# Patient Record
Sex: MALE | Race: ASIAN | NOT HISPANIC OR LATINO | Employment: FULL TIME | URBAN - METROPOLITAN AREA
[De-identification: names, ages, dates, MRNs, and addresses within clinical notes are randomized per-mention and may not be internally consistent; named-entity substitution may affect disease eponyms.]

---

## 2019-11-08 ENCOUNTER — OFFICE VISIT (OUTPATIENT)
Dept: FAMILY MEDICINE | Facility: CLINIC | Age: 34
End: 2019-11-08
Payer: COMMERCIAL

## 2019-11-08 VITALS
RESPIRATION RATE: 16 BRPM | HEART RATE: 70 BPM | HEIGHT: 66 IN | TEMPERATURE: 97.8 F | BODY MASS INDEX: 22.98 KG/M2 | DIASTOLIC BLOOD PRESSURE: 78 MMHG | WEIGHT: 143 LBS | OXYGEN SATURATION: 99 % | SYSTOLIC BLOOD PRESSURE: 116 MMHG

## 2019-11-08 DIAGNOSIS — K64.8 INTERNAL HEMORRHOIDS: ICD-10-CM

## 2019-11-08 DIAGNOSIS — K62.5 PAINLESS RECTAL BLEEDING: ICD-10-CM

## 2019-11-08 DIAGNOSIS — Z00.00 ROUTINE GENERAL MEDICAL EXAMINATION AT A HEALTH CARE FACILITY: Primary | ICD-10-CM

## 2019-11-08 LAB
ALBUMIN SERPL-MCNC: 4.1 G/DL (ref 3.4–5)
ALP SERPL-CCNC: 52 U/L (ref 40–150)
ALT SERPL W P-5'-P-CCNC: 21 U/L (ref 0–70)
ANION GAP SERPL CALCULATED.3IONS-SCNC: 6 MMOL/L (ref 3–14)
AST SERPL W P-5'-P-CCNC: 21 U/L (ref 0–45)
BASOPHILS # BLD AUTO: 0 10E9/L (ref 0–0.2)
BASOPHILS NFR BLD AUTO: 0.3 %
BILIRUB SERPL-MCNC: 1.6 MG/DL (ref 0.2–1.3)
BUN SERPL-MCNC: 14 MG/DL (ref 7–30)
CALCIUM SERPL-MCNC: 8.7 MG/DL (ref 8.5–10.1)
CHLORIDE SERPL-SCNC: 105 MMOL/L (ref 94–109)
CHOLEST SERPL-MCNC: 143 MG/DL
CO2 SERPL-SCNC: 29 MMOL/L (ref 20–32)
CREAT SERPL-MCNC: 0.89 MG/DL (ref 0.66–1.25)
DIFFERENTIAL METHOD BLD: NORMAL
EOSINOPHIL # BLD AUTO: 0.1 10E9/L (ref 0–0.7)
EOSINOPHIL NFR BLD AUTO: 1.3 %
ERYTHROCYTE [DISTWIDTH] IN BLOOD BY AUTOMATED COUNT: 12.2 % (ref 10–15)
GFR SERPL CREATININE-BSD FRML MDRD: >90 ML/MIN/{1.73_M2}
GLUCOSE SERPL-MCNC: 85 MG/DL (ref 70–99)
HCT VFR BLD AUTO: 42 % (ref 40–53)
HDLC SERPL-MCNC: 40 MG/DL
HGB BLD-MCNC: 14.7 G/DL (ref 13.3–17.7)
LDLC SERPL CALC-MCNC: 86 MG/DL
LYMPHOCYTES # BLD AUTO: 1.8 10E9/L (ref 0.8–5.3)
LYMPHOCYTES NFR BLD AUTO: 28.9 %
MCH RBC QN AUTO: 30.4 PG (ref 26.5–33)
MCHC RBC AUTO-ENTMCNC: 35 G/DL (ref 31.5–36.5)
MCV RBC AUTO: 87 FL (ref 78–100)
MONOCYTES # BLD AUTO: 0.4 10E9/L (ref 0–1.3)
MONOCYTES NFR BLD AUTO: 5.9 %
NEUTROPHILS # BLD AUTO: 4 10E9/L (ref 1.6–8.3)
NEUTROPHILS NFR BLD AUTO: 63.6 %
NONHDLC SERPL-MCNC: 103 MG/DL
PLATELET # BLD AUTO: 200 10E9/L (ref 150–450)
POTASSIUM SERPL-SCNC: 3.5 MMOL/L (ref 3.4–5.3)
PROT SERPL-MCNC: 7.9 G/DL (ref 6.8–8.8)
RBC # BLD AUTO: 4.84 10E12/L (ref 4.4–5.9)
SODIUM SERPL-SCNC: 140 MMOL/L (ref 133–144)
TRIGL SERPL-MCNC: 86 MG/DL
WBC # BLD AUTO: 6.3 10E9/L (ref 4–11)

## 2019-11-08 PROCEDURE — 99213 OFFICE O/P EST LOW 20 MIN: CPT | Mod: 25 | Performed by: INTERNAL MEDICINE

## 2019-11-08 PROCEDURE — 85025 COMPLETE CBC W/AUTO DIFF WBC: CPT | Performed by: INTERNAL MEDICINE

## 2019-11-08 PROCEDURE — 80053 COMPREHEN METABOLIC PANEL: CPT | Performed by: INTERNAL MEDICINE

## 2019-11-08 PROCEDURE — 80061 LIPID PANEL: CPT | Performed by: INTERNAL MEDICINE

## 2019-11-08 PROCEDURE — 99385 PREV VISIT NEW AGE 18-39: CPT | Performed by: INTERNAL MEDICINE

## 2019-11-08 PROCEDURE — 36415 COLL VENOUS BLD VENIPUNCTURE: CPT | Performed by: INTERNAL MEDICINE

## 2019-11-08 RX ORDER — HYDROCORTISONE ACETATE 25 MG/1
25 SUPPOSITORY RECTAL 2 TIMES DAILY
Qty: 24 SUPPOSITORY | Refills: 2 | Status: SHIPPED | OUTPATIENT
Start: 2019-11-08

## 2019-11-08 ASSESSMENT — PATIENT HEALTH QUESTIONNAIRE - PHQ9: SUM OF ALL RESPONSES TO PHQ QUESTIONS 1-9: 13

## 2019-11-08 ASSESSMENT — PAIN SCALES - GENERAL: PAINLEVEL: NO PAIN (0)

## 2019-11-08 ASSESSMENT — MIFFLIN-ST. JEOR: SCORE: 1541.77

## 2019-11-08 NOTE — LETTER
[address]  [address]  [phone]    November 19, 2019      Manisha Her  [address]  [facility] [address]          Dear Manisha,                     Your cholesterol (lipid) panel is normal. You do not have diabetes. Both kidney and liver function are normal. You are also not anemic.Let's repeat your tests in one year. For any questions, you may call my office at [phone].     Sincerely,     Dean Salas MD   Internal Medicine

## 2019-11-08 NOTE — PATIENT INSTRUCTIONS
Preventive Health Recommendations  Male Ages 26 - 39    Yearly exam:             See your health care provider every year in order to  o   Review health changes.   o   Discuss preventive care.    o   Review your medicines if your doctor has prescribed any.    You should be tested each year for STDs (sexually transmitted diseases), if you re at risk.     After age 35, talk to your provider about cholesterol testing. If you are at risk for heart disease, have your cholesterol tested at least every 5 years.     If you are at risk for diabetes, you should have a diabetes test (fasting glucose).  Shots: Get a flu shot each year. Get a tetanus shot every 10 years.     Nutrition:    Eat at least 5 servings of fruits and vegetables daily.     Eat whole-grain bread, whole-wheat pasta and brown rice instead of white grains and rice.     Get adequate Calcium and Vitamin D.     Lifestyle    Exercise for at least 150 minutes a week (30 minutes a day, 5 days a week). This will help you control your weight and prevent disease.     Limit alcohol to one drink per day.     No smoking.     Wear sunscreen to prevent skin cancer.     See your dentist every six months for an exam and cleaning.   At [facility], we strive to deliver an exceptional experience to you, every time we see you.  If you receive a survey in the mail, please send us back your thoughts. We really do value your feedback.    Based on your medical history, these are the current health maintenance/preventive care services that you are due for (some may have been done at this visit.)  Health Maintenance Due   Topic Date Due     PREVENTIVE CARE VISIT  1986     HIV SCREENING  08/17/2001     DTAP/TDAP/TD IMMUNIZATION (1 - Tdap) 08/17/2011     PHQ-2  01/01/2019     INFLUENZA VACCINE (1) 09/01/2019         Suggested websites for health information:  Www.[facility].[employer] : Up to date and easily searchable information on multiple  topics.  Www.medlineplus.gov : medication info, interactive tutorials, watch real surgeries online  Www.familydoctor.org : good info from the Academy of Family Physicians  Www.cdc.gov : public health info, travel advisories, epidemics (H1N1)  Www.aap.org : children's health info, normal development, vaccinations  Www.health.[facility].[place].us : [place] dept of health, public health issues in [place], N1N1    Your care team:                            Family Medicine Internal Medicine   MD Dean Caballero MD Shantel Branch-Fleming, MD Katya Georgiev PA-C Nam Ho, MD Pediatrics   JERILYN Jack, CNP Rosario Estevez APRN CNP   MD Stefany Naylor MD Deborah Mielke, MD Kim Thein, APRN CNP      Clinic hours: Monday - Thursday 7 am-7 pm; Fridays 7 am-5 pm.   Urgent care: Monday - Friday 11 am-9 pm; Saturday and Sunday 9 am-5 pm.  Pharmacy : Monday -Thursday 8 am-8 pm; Friday 8 am-6 pm; Saturday and Sunday 9 am-5 pm.     Clinic: [phone]   Pharmacy: [phone]    Patient Education     Hemorrhoids    Hemorrhoids are swollen and inflamed veins inside the rectum and near the anus. The rectum is the last several inches of the colon. The anus is the passage between the rectum and the outside of the body.  Causes  The veins can become swollen due to increased pressure in them. This is most often caused by:    Chronic constipation or diarrhea    Straining when having a bowel movement    Sitting too long on the toilet    A low-fiber diet    Pregnancy  Symptoms    Bleeding from the rectum (this may be noticeable after bowel movements)    Lump near the anus    Itching around the anus    Pain around the anus  There are different types of hemorrhoids. Depending on the type you have and the severity, you may be able to treat yourself at home. In some cases, a procedure may be the best treatment option. Your healthcare provider can tell you more about this, if needed.  Home  care  General care    To get relief from pain or itching, try:  ? Medicines. Your healthcare provider may recommend stool softeners, suppositories, or laxatives to help manage constipation. Use these exactly as directed.  ? Sitz baths. A sitz bath involves sitting in a few inches of warm bath water. Be careful not to make the water so hot that you burn yourself--test it before sitting in it. Soak for about 10 to 15 minutes a few times a day. This may help relieve pain.  ? Topical products. Your healthcare provider may prescribe or recommend creams, ointments, or pads that can be applied to the hemorrhoid. Use these exactly as directed.  Tips to help prevent hemorrhoids    Eat more fiber. Fiber adds bulk to stool and absorbs water as it moves through your colon. This makes stool softer and easier to pass.  ? Increase the fiber in your diet with more fiber-rich foods. These include fresh fruit, vegetables, and whole grains.  ? Take a fiber supplement or bulking agent, if advised by your healthcare provider. These include products such as psyllium or methylcellulose.    Drink more water. Your healthcare provider may direct you to drink plenty of water. This can help keep stool soft.    Be more active. Frequent exercise aids digestion and helps prevent constipation. It may also help make bowel movements more regular.    Don t strain during bowel movements. This can make hemorrhoids more likely. Also, don t sit on the toilet for long periods of time.  Follow-up care  Follow up with your healthcare provider as advised. If a culture or imaging tests were done, someone will let you know the results when they are ready. This may take a few days or longer. If your healthcare provider recommends a procedure for your hemorrhoids, these options can be discussed. Options may include surgery and outpatient office treatments.  When to seek medical advice  Call your healthcare provider right away if any of these occur:    Increased  bleeding from the rectum    Increased pain around the rectum or anus    Weakness or dizziness  Call 911  Call 911 if any of these occur:    Trouble breathing or swallowing    Fainting or loss of consciousness    Unusually fast heart rate    Vomiting blood    Large amounts of blood in stool or black, tarry stools  Date Last Reviewed: 9/1/2017 2000-2018 [employer]. [address] [place], [place] [id number]. All rights reserved. This information is not intended as a substitute for professional medical care. Always follow your healthcare professional's instructions.

## 2019-11-08 NOTE — PROGRESS NOTES
3  SUBJECTIVE:   CC: Manisha Nassar is an 34 year old male who presents for preventive health visit.     Healthy Habits:    Do you get at least three servings of calcium containing foods daily (dairy, green leafy vegetables, etc.)? No    Amount of exercise or daily activities, outside of work: 3 day(s) per week    Problems taking medications regularly not applicable    Medication side effects: No    Have you had an eye exam in the past two years? yes    Do you see a dentist twice per year? no    Do you have sleep apnea, excessive snoring or daytime drowsiness?no      Rectal concerns      Duration: acute    Description (location/character/radiation): Painless rectal bleeding.    Intensity:  mild    Accompanying signs and symptoms: Denies any weight loss, anorexia, abdominal pain nor fatigue.    History (similar episodes/previous evaluation): None    Precipitating or alleviating factors: Unknown    Therapies tried and outcome: None         Today's PHQ-2 Score: No flowsheet data found.    Abuse: Current or Past(Physical, Sexual or Emotional)- No  Do you feel safe in your environment? Yes        Social History     Tobacco Use     Smoking status: Former Smoker     Packs/day: 0.00     Smokeless tobacco: Never Used   Substance Use Topics     Alcohol use: Yes     If you drink alcohol do you typically have >3 drinks per day or >7 drinks per week? No                      Last PSA: No results found for: PSA    Reviewed orders with patient. Reviewed health maintenance and updated orders accordingly - Yes  Labs reviewed in EPIC  BP Readings from Last 3 Encounters:   11/08/19 116/78    Wt Readings from Last 3 Encounters:   11/08/19 64.9 kg (143 lb)                  Patient Active Problem List   Diagnosis     Internal hemorrhoids     Painless rectal bleeding     History reviewed. No pertinent surgical history.    Social History     Tobacco Use     Smoking status: Former Smoker     Packs/day: 0.00     Smokeless tobacco: Never Used  "  Substance Use Topics     Alcohol use: Yes     History reviewed. No pertinent family history.      No Known Allergies  Recent Labs   Lab Test 11/08/19  0915   LDL 86   HDL 40   TRIG 86   ALT 21   CR 0.89   GFRESTIMATED >90   GFRESTBLACK >90   POTASSIUM 3.5        Reviewed and updated as needed this visit by clinical staff  Tobacco  Allergies  Meds  Soc Hx        Reviewed and updated as needed this visit by Provider            ROS:  CONSTITUTIONAL: NEGATIVE for fever, chills, change in weight  INTEGUMENTARY/SKIN: NEGATIVE for worrisome rashes, moles or lesions  EYES: NEGATIVE for vision changes or irritation  ENT: NEGATIVE for ear, mouth and throat problems  RESP: NEGATIVE for significant cough or SOB  CV: NEGATIVE for chest pain, palpitations or peripheral edema  GI: NEGATIVE for heartburn or reflux, hematemesis, hematochezia, jaundice, melena, nausea and poor appetite   male: negative for dysuria, hematuria, decreased urinary stream, erectile dysfunction, urethral discharge  MUSCULOSKELETAL: NEGATIVE for significant arthralgias or myalgia  NEURO: NEGATIVE for weakness, dizziness or paresthesias  PSYCHIATRIC: NEGATIVE for changes in mood or affect    OBJECTIVE:   /78 (BP Location: Left arm, Patient Position: Chair, Cuff Size: Adult Regular)   Pulse 70   Temp 97.8  F (36.6  C) (Oral)   Resp 16   Ht 1.685 m (5' 6.34\")   Wt 64.9 kg (143 lb)   SpO2 99%   BMI 22.85 kg/m    EXAM:  GENERAL: healthy, alert and no distress  EYES: Eyes grossly normal to inspection  HENT: normal cephalic/atraumatic and oral mucous membranes moist  NECK: no adenopathy and thyroid normal to palpation  RESP: lungs clear to auscultation - no rales, rhonchi or wheezes  CV: regular rate and rhythm, normal S1 S2, no S3 or S4, no murmur, click or rub, no peripheral edema and peripheral pulses strong  ABDOMEN: soft, nontender, no hepatosplenomegaly, no masses and bowel sounds normal  RECTAL (male): normal sphincter tone, no rectal " masses and no external hemorrhoids.  MS: no gross musculoskeletal defects noted, no edema  SKIN: no suspicious lesions or rashes  NEURO: Normal strength and tone, mentation intact and speech normal  PSYCH: mentation appears normal, affect normal/bright    Diagnostic Test Results:  Results for orders placed or performed in visit on 11/08/19   CBC with platelets and differential     Status: None   Result Value Ref Range    WBC 6.3 4.0 - 11.0 10e9/L    RBC Count 4.84 4.4 - 5.9 10e12/L    Hemoglobin 14.7 13.3 - 17.7 g/dL    Hematocrit 42.0 40.0 - 53.0 %    MCV 87 78 - 100 fl    MCH 30.4 26.5 - 33.0 pg    MCHC 35.0 31.5 - 36.5 g/dL    RDW 12.2 10.0 - 15.0 %    Platelet Count 200 150 - 450 10e9/L    % Neutrophils 63.6 %    % Lymphocytes 28.9 %    % Monocytes 5.9 %    % Eosinophils 1.3 %    % Basophils 0.3 %    Absolute Neutrophil 4.0 1.6 - 8.3 10e9/L    Absolute Lymphocytes 1.8 0.8 - 5.3 10e9/L    Absolute Monocytes 0.4 0.0 - 1.3 10e9/L    Absolute Eosinophils 0.1 0.0 - 0.7 10e9/L    Absolute Basophils 0.0 0.0 - 0.2 10e9/L    Diff Method Automated Method    Comprehensive metabolic panel (BMP + Alb, Alk Phos, ALT, AST, Total. Bili, TP)     Status: Abnormal   Result Value Ref Range    Sodium 140 133 - 144 mmol/L    Potassium 3.5 3.4 - 5.3 mmol/L    Chloride 105 94 - 109 mmol/L    Carbon Dioxide 29 20 - 32 mmol/L    Anion Gap 6 3 - 14 mmol/L    Glucose 85 70 - 99 mg/dL    Urea Nitrogen 14 7 - 30 mg/dL    Creatinine 0.89 0.66 - 1.25 mg/dL    GFR Estimate >90 >60 mL/min/[1.73_m2]    GFR Estimate If Black >90 >60 mL/min/[1.73_m2]    Calcium 8.7 8.5 - 10.1 mg/dL    Bilirubin Total 1.6 (H) 0.2 - 1.3 mg/dL    Albumin 4.1 3.4 - 5.0 g/dL    Protein Total 7.9 6.8 - 8.8 g/dL    Alkaline Phosphatase 52 40 - 150 U/L    ALT 21 0 - 70 U/L    AST 21 0 - 45 U/L   Lipid panel reflex to direct LDL Non-fasting     Status: None   Result Value Ref Range    Cholesterol 143 <200 mg/dL    Triglycerides 86 <150 mg/dL    HDL Cholesterol 40 >39 mg/dL  "   LDL Cholesterol Calculated 86 <100 mg/dL    Non HDL Cholesterol 103 <130 mg/dL       ASSESSMENT/PLAN:   1. Routine general medical examination at a health care facility  No family history of premature atherosclerotic heart disease or early-onset colon cancer.  - CBC with platelets and differential  - Comprehensive metabolic panel (BMP + Alb, Alk Phos, ALT, AST, Total. Bili, TP)  - Lipid panel reflex to direct LDL Non-fasting    2. Painless rectal bleeding  Rule out rectal malignancy.  - GASTROENTEROLOGY ADULT REF PROCEDURE ONLY [facility] [phone]; No Provider Preference    3. Internal hemorrhoids  Most probable etiology of painless rectal bleeding.  - hydrocortisone (ANUSOL-HC) 25 MG suppository; Place 1 suppository (25 mg) rectally 2 times daily  Dispense: 24 suppository; Refill: 2    COUNSELING:  Special attention given to:        Regular exercise       Healthy diet/nutrition    Estimated body mass index is 22.85 kg/m  as calculated from the following:    Height as of this encounter: 1.685 m (5' 6.34\").    Weight as of this encounter: 64.9 kg (143 lb).         reports that he has quit smoking. He smoked 0.00 packs per day. He has never used smokeless tobacco.      Counseling Resources:  ATP IV Guidelines  Pooled Cohorts Equation Calculator  FRAX Risk Assessment  ICSI Preventive Guidelines  Dietary Guidelines for Americans, 2010  USDA's MyPlate  ASA Prophylaxis  Lung CA Screening    Dean Salas MD  [facility]  "

## 2019-11-26 ENCOUNTER — HOSPITAL ENCOUNTER (OUTPATIENT)
Facility: AMBULATORY SURGERY CENTER | Age: 34
Discharge: HOME | End: 2019-11-26
Attending: SURGERY | Admitting: SURGERY
Payer: COMMERCIAL

## 2019-11-26 VITALS
DIASTOLIC BLOOD PRESSURE: 78 MMHG | TEMPERATURE: 97.2 F | RESPIRATION RATE: 16 BRPM | OXYGEN SATURATION: 99 % | HEART RATE: 73 BPM | SYSTOLIC BLOOD PRESSURE: 105 MMHG

## 2019-11-26 LAB — COLONOSCOPY: NORMAL

## 2019-11-26 PROCEDURE — 99152 MOD SED SAME PHYS/QHP 5/>YRS: CPT | Mod: 59 | Performed by: SURGERY

## 2019-11-26 PROCEDURE — 45378 DIAGNOSTIC COLONOSCOPY: CPT

## 2019-11-26 PROCEDURE — 45378 DIAGNOSTIC COLONOSCOPY: CPT | Performed by: SURGERY

## 2019-11-26 PROCEDURE — G8918 PT W/O PREOP ORDER IV AB PRO: HCPCS

## 2019-11-26 PROCEDURE — G8907 PT DOC NO EVENTS ON DISCHARG: HCPCS

## 2019-11-26 RX ORDER — ONDANSETRON 4 MG/1
4 TABLET, ORALLY DISINTEGRATING ORAL EVERY 6 HOURS PRN
Status: DISCONTINUED | OUTPATIENT
Start: 2019-11-26 | End: 2019-11-27 | Stop reason: HOSPADM

## 2019-11-26 RX ORDER — FENTANYL CITRATE 50 UG/ML
INJECTION, SOLUTION INTRAMUSCULAR; INTRAVENOUS PRN
Status: DISCONTINUED | OUTPATIENT
Start: 2019-11-26 | End: 2019-11-26 | Stop reason: HOSPADM

## 2019-11-26 RX ORDER — ONDANSETRON 2 MG/ML
4 INJECTION INTRAMUSCULAR; INTRAVENOUS
Status: DISCONTINUED | OUTPATIENT
Start: 2019-11-26 | End: 2019-11-27 | Stop reason: HOSPADM

## 2019-11-26 RX ORDER — LIDOCAINE 40 MG/G
CREAM TOPICAL
Status: DISCONTINUED | OUTPATIENT
Start: 2019-11-26 | End: 2019-11-27 | Stop reason: HOSPADM

## 2019-11-26 RX ORDER — NALOXONE HYDROCHLORIDE 0.4 MG/ML
.1-.4 INJECTION, SOLUTION INTRAMUSCULAR; INTRAVENOUS; SUBCUTANEOUS
Status: DISCONTINUED | OUTPATIENT
Start: 2019-11-26 | End: 2019-11-27 | Stop reason: HOSPADM

## 2019-11-26 RX ORDER — ONDANSETRON 2 MG/ML
4 INJECTION INTRAMUSCULAR; INTRAVENOUS EVERY 6 HOURS PRN
Status: DISCONTINUED | OUTPATIENT
Start: 2019-11-26 | End: 2019-11-27 | Stop reason: HOSPADM

## 2019-11-26 RX ORDER — FLUMAZENIL 0.1 MG/ML
0.2 INJECTION, SOLUTION INTRAVENOUS
Status: SHIPPED | OUTPATIENT
Start: 2019-11-26 | End: 2019-11-26

## 2024-07-05 ENCOUNTER — OFFICE VISIT (OUTPATIENT)
Dept: URGENT CARE | Facility: URGENT CARE | Age: 39
End: 2024-07-05
Payer: COMMERCIAL

## 2024-07-05 VITALS
SYSTOLIC BLOOD PRESSURE: 123 MMHG | HEART RATE: 84 BPM | RESPIRATION RATE: 16 BRPM | WEIGHT: 159.6 LBS | BODY MASS INDEX: 25.5 KG/M2 | TEMPERATURE: 97.7 F | OXYGEN SATURATION: 98 % | DIASTOLIC BLOOD PRESSURE: 82 MMHG

## 2024-07-05 DIAGNOSIS — H66.001 ACUTE SUPPURATIVE OTITIS MEDIA OF RIGHT EAR WITHOUT SPONTANEOUS RUPTURE OF TYMPANIC MEMBRANE, RECURRENCE NOT SPECIFIED: Primary | ICD-10-CM

## 2024-07-05 DIAGNOSIS — Z86.69 HISTORY OF PERFORATION OF TYMPANIC MEMBRANE: ICD-10-CM

## 2024-07-05 PROCEDURE — 99203 OFFICE O/P NEW LOW 30 MIN: CPT | Performed by: PHYSICIAN ASSISTANT

## 2024-07-05 NOTE — PROGRESS NOTES
Chief Complaint   Patient presents with    Otalgia     Right; feels swollen 3/10 pain                   ASSESSMENT:     ICD-10-CM    1. Acute suppurative otitis media of right ear without spontaneous rupture of tympanic membrane, recurrence not specified  H66.001 amoxicillin-clavulanate (AUGMENTIN) 875-125 MG tablet      2. History of perforation of tympanic membrane  Z86.69             PLAN: Right otitis media.  Augmentin.  I have discussed clinical findings with patient.  Side effects of medications discussed.  Symptomatic care is discussed.  I have discussed the possibility of  worsening symptoms and indication to RTC or go to the ER if they occur.  All questions are answered, patient indicates understanding of these issues and is in agreement with plan.   Patient care instructions are discussed/given at the end of visit.       Alma Delia Cruz PA-C      SUBJECTIVE:  38-year-old with recurrent ear infections presents for right ear pain that started today.  Getting over a head cold.  History of TM patch right ear.  Has permanent left TM perforation.      No Known Allergies    No past medical history on file.    Current Outpatient Medications   Medication Sig Dispense Refill    hydrocortisone (ANUSOL-HC) 25 MG suppository Place 1 suppository (25 mg) rectally 2 times daily (Patient not taking: Reported on 7/5/2024) 24 suppository 2     No current facility-administered medications for this visit.       Social History     Tobacco Use    Smoking status: Former     Types: Cigarettes    Smokeless tobacco: Never   Substance Use Topics    Alcohol use: Yes       ROS:  CONSTITUTIONAL: Negative for fatigue or fever.  EYES: Negative for eye problems.  ENT: As above.  RESP: As above.  CV: Negative for chest pains.  GI: Negative for vomiting.  MUSCULOSKELETAL:  Negative for significant muscle or joint pains.  NEUROLOGIC: Negative for headaches.  SKIN: Negative for rash.  PSYCH: Normal mentation for age.    OBJECTIVE:  BP  123/82   Pulse 84   Temp 97.7  F (36.5  C) (Tympanic)   Resp 16   Wt 72.4 kg (159 lb 9.6 oz)   SpO2 98%   BMI 25.50 kg/m    GENERAL APPEARANCE: Healthy, alert and no distress.  EYES:Conjunctiva/sclera clear.  EARS: Able to hear me rub my fingers outside both ears.  Left TM without erythema, perforation present.  Right TM is erythematous, dull and retracted.  No obvious perforation noted.     THROAT: No erythema w/o tonsillar enlargement . No exudates.  NECK: Supple, nontender, no lymphadenopathy.  RESP: Breathing comfortably   NEURO: Awake, alert    SKIN: No rashes      Alma Delia Cruz PA-C

## (undated) DEVICE — PAD CHUX UNDERPAD 23X24" 7136

## (undated) DEVICE — PREP CHLORAPREP 26ML TINTED ORANGE  260815

## (undated) DEVICE — KIT ENDO FIRST STEP DISINFECTANT 200ML W/POUCH EP-4

## (undated) RX ORDER — FENTANYL CITRATE 50 UG/ML
INJECTION, SOLUTION INTRAMUSCULAR; INTRAVENOUS
Status: DISPENSED
Start: 2019-11-26

## (undated) RX ORDER — SIMETHICONE 40MG/0.6ML
SUSPENSION, DROPS(FINAL DOSAGE FORM)(ML) ORAL
Status: DISPENSED
Start: 2019-11-26